# Patient Record
Sex: MALE | Race: WHITE
[De-identification: names, ages, dates, MRNs, and addresses within clinical notes are randomized per-mention and may not be internally consistent; named-entity substitution may affect disease eponyms.]

---

## 2017-12-23 ENCOUNTER — HOSPITAL ENCOUNTER (EMERGENCY)
Dept: HOSPITAL 45 - C.EDB | Age: 67
Discharge: HOME | End: 2017-12-23
Payer: COMMERCIAL

## 2017-12-23 VITALS
BODY MASS INDEX: 25.23 KG/M2 | WEIGHT: 186.29 LBS | HEIGHT: 72.01 IN | HEIGHT: 72.01 IN | WEIGHT: 186.29 LBS | BODY MASS INDEX: 25.23 KG/M2

## 2017-12-23 VITALS — HEART RATE: 72 BPM | DIASTOLIC BLOOD PRESSURE: 78 MMHG | OXYGEN SATURATION: 97 % | SYSTOLIC BLOOD PRESSURE: 138 MMHG

## 2017-12-23 VITALS — TEMPERATURE: 97.7 F

## 2017-12-23 DIAGNOSIS — G25.0: ICD-10-CM

## 2017-12-23 DIAGNOSIS — K61.0: Primary | ICD-10-CM

## 2017-12-23 LAB
ANION GAP SERPL CALC-SCNC: 17 MMOL/L (ref 16–25)
ANION GAP SERPL CALC-SCNC: 3 MMOL/L (ref 3–11)
BASOPHILS # BLD: 0.01 K/UL (ref 0–0.2)
BASOPHILS NFR BLD: 0.1 %
BUN SERPL-MCNC: 15 MG/DL (ref 7–18)
BUN/CREAT SERPL: 27 (ref 10–20)
CA-I BLD-SCNC: 1.18 MMOL/L (ref 1.12–1.32)
CALCIUM SERPL-MCNC: 9.1 MG/DL (ref 8.5–10.1)
CHLORIDE BLD-SCNC: 102 MEQ/L (ref 101–112)
CHLORIDE SERPL-SCNC: 104 MMOL/L (ref 98–107)
CO2 SERPL-SCNC: 29 MMOL/L (ref 21–32)
COMPLETE: YES
CREAT BLD-MCNC: 0.6 MG/DL (ref 0.6–1.3)
CREAT CL PREDICTED SERPL C-G-VRATE: 134.3 ML/MIN
CREAT SERPL-MCNC: 0.57 MG/DL (ref 0.6–1.4)
EOSINOPHIL NFR BLD AUTO: 121 K/UL (ref 130–400)
GLUCOSE SERPL-MCNC: 91 MG/DL (ref 70–99)
HCT VFR BLD AUTO: 43 % (ref 42–52)
HCT VFR BLD CALC: 45.1 % (ref 42–52)
HGB BLD-MCNC: 14.6 G/DL (ref 14–18)
IG%: 0.3 %
IMM GRANULOCYTES NFR BLD AUTO: 34.8 %
ISTAT CARBON DIOXIDE: 27 MEQ/L (ref 24–31)
LYMPHOCYTES # BLD: 3.82 K/UL (ref 1.2–3.4)
MCH RBC QN AUTO: 27.2 PG (ref 25–34)
MCHC RBC AUTO-ENTMCNC: 32.8 G/DL (ref 32–36)
MCV RBC AUTO: 82.8 FL (ref 80–100)
MONOCYTES NFR BLD: 7.9 %
NEUTROPHILS # BLD AUTO: 0.4 %
NEUTROPHILS NFR BLD AUTO: 56.5 %
PMV BLD AUTO: 9.3 FL (ref 7.4–10.4)
POTASSIUM SERPL-SCNC: 4.1 MMOL/L (ref 3.5–5.1)
RBC # BLD AUTO: 5.45 M/UL (ref 4.7–6.1)
SODIUM BLD-SCNC: 140 MEQ/L (ref 135–144)
SODIUM SERPL-SCNC: 136 MMOL/L (ref 136–145)
WBC # BLD AUTO: 10.98 K/UL (ref 4.8–10.8)

## 2017-12-23 NOTE — EMERGENCY ROOM VISIT NOTE
History


First contact with patient:  12:52


Chief Complaint:  WOUND INFECTION


Stated Complaint:  CYST/LUMP ON R BUTTOCK


Nursing Triage Summary:  


Thursday pt developed "a lump on right buttock"





red and hot and growing, 





no drainage





History of Present Illness


The patient is a 67 year old male who presents to the Emergency Room via 

private vehicle with complaints of "Cyst/lump on R buttock"/ the patient states 

that he has history of systems developing on his body however he developed pain 

in the right cranial region this past Thursday and notes swelling that has been 

progressing.  He states that there is been no drainage.  He does have a fissure 

on the other side.  He notes that he has had cysts for years but has not seen 

anybody specifically for these.  He rates the right gluteal pain as a 5/10.  He 

notes night sweats but is unsure if he's had fevers.





Review of Systems


A complete 10-point Review of Systems was discussed with the patient, with 

pertinent positives and negatives listed in the History of Present Illness. All 

remaining Review of Systems questions can be considered negative unless 

otherwise specified.





Past Medical/Surgical History


Hernia repair, renal calculi, essential tremor, cysts





Family History


Essential tremor





Social History


Smoking Status:  Never Smoker


Patient lives locally with wife.





Current/Historical Medications


Scheduled


Ciprofloxacin Hcl (Cipro), 500 MG PO BID


Metronidazole (Flagyl), 500 MG PO TID


Propranolol HCl (Propranolol HCl), 60 MG PO BID





Physical Exam


Vital Signs











  Date Time  Temp Pulse Resp B/P (MAP) Pulse Ox O2 Delivery O2 Flow Rate FiO2


 


12/23/17 17:05  72 18 138/78 97   


 


12/23/17 15:00  80 20 140/80 98 Room Air  


 


12/23/17 13:57  83 20 141/78 97 Room Air  


 


12/23/17 12:34 36.5 85 20 153/81 96 Room Air  











Physical Exam


VITAL SIGNS - Vital signs and nursing notes were reviewed.  Stable.  Afebrile.


GENERAL -67-year-old male appearing his stated age who is in no acute distress. 

Communicates well with provider and answers questions appropriately.


SKIN - Without rashes.  On the right medial and superior portion of the right 

gluteal/right posterior scrotal region there is a 3 cm circular palpable region 

that appears to be low density.  There is no erythema.  Minimal tenderness to 

palpation.


HEAD - NC/AT.





Medical Decision & Procedures


ER Provider


Diagnostic Interpretation:


RIGHT PERINEUM ULTRASOUND





CLINICAL HISTORY: R anterior gluteal/posterior scrotal palpable lump.   





COMPARISON STUDY:  None.





FINDINGS: Increase echogenicity within the subcutaneous fat consistent with


inflammatory change. There is a 4.5 x 3.3 x 2.1 cm subcutaneous fluid collection


within the right perineum.





IMPRESSION:  A 4.5 x 3.3 x 2.1 cm subcutaneous fluid collection within the right


perineum. This likely represents an abscess. 














Electronically signed by:  Eric Irvin M.D.


12/23/2017 2:37 PM





Dictated Date/Time:  12/23/2017 2:35 PM





PELVIS W/IV CONT ONLY (CT)





HISTORY: Right perirectal lump/scrotal collection.     





TECHNIQUE: Multiaxial CT images of the pelvis are performed following the use of


intravenous contrast.





COMPARISON STUDY:  Gluteal ultrasound 12/23/2017.





FINDINGS: Confirmation of the 4.0 x 2.5 cm right anterior perianal/perineal


peripheral enhancing fluid collection consistent with an abscess. There is


associated soft tissue thickening within the right side of the external


sphincter of the rectum and edema within the right intersphincteric space


suggestive of a small fistula. The prostate gland is enlarged measuring 6.4 cm


in diameter. Normal bladder. Small fat-containing right inguinal hernia. Colonic


diverticulosis. Small fat-containing umbilical hernia. Trace bilateral


hydroceles. No suspicious lytic or blastic osseous lesions.





IMPRESSION:  


A 4.0 x 2.5 cm right perianal abscess. There is also suggestion of a small


fistula extending into the right perirectal location which would be expected


with the perianal abscess. 











Electronically signed by:  Eric Irvin M.D.


12/23/2017 3:39 PM





Dictated Date/Time:  12/23/2017 3:30 PM





Laboratory Results


12/23/17 14:50








Red Blood Count 5.45, Mean Corpuscular Volume 82.8, Mean Corpuscular Hemoglobin 

27.2, Mean Corpuscular Hemoglobin Concent 32.8, Mean Platelet Volume 9.3, 

Neutrophils (%) (Auto) 56.5, Lymphocytes (%) (Auto) 34.8, Monocytes (%) (Auto) 

7.9, Eosinophils (%) (Auto) 0.4, Basophils (%) (Auto) 0.1, Neutrophils # (Auto) 

6.21, Lymphocytes # (Auto) 3.82, Monocytes # (Auto) 0.87, Eosinophils # (Auto) 

0.04, Basophils # (Auto) 0.01





12/23/17 14:50

















Test


  12/23/17


14:50


 


White Blood Count


  10.98 K/uL


(4.8-10.8)


 


Red Blood Count


  5.45 M/uL


(4.7-6.1)


 


Hemoglobin


  14.8 g/dL


(14.0-18.0)


 


Hematocrit 45.1 % (42-52) 


 


Mean Corpuscular Volume


  82.8 fL


()


 


Mean Corpuscular Hemoglobin


  27.2 pg


(25-34)


 


Mean Corpuscular Hemoglobin


Concent 32.8 g/dl


(32-36)


 


Platelet Count


  121 K/uL


(130-400)


 


Mean Platelet Volume


  9.3 fL


(7.4-10.4)


 


Neutrophils (%) (Auto) 56.5 % 


 


Lymphocytes (%) (Auto) 34.8 % 


 


Monocytes (%) (Auto) 7.9 % 


 


Eosinophils (%) (Auto) 0.4 % 


 


Basophils (%) (Auto) 0.1 % 


 


Neutrophils # (Auto)


  6.21 K/uL


(1.4-6.5)


 


Lymphocytes # (Auto)


  3.82 K/uL


(1.2-3.4)


 


Monocytes # (Auto)


  0.87 K/uL


(0.11-0.59)


 


Eosinophils # (Auto)


  0.04 K/uL


(0-0.5)


 


Basophils # (Auto)


  0.01 K/uL


(0-0.2)


 


RDW Standard Deviation


  43.8 fL


(36.4-46.3)


 


RDW Coefficient of Variation


  14.5 %


(11.5-14.5)


 


Immature Granulocyte % (Auto) 0.3 % 


 


Immature Granulocyte # (Auto)


  0.03 K/uL


(0.00-0.02)


 


Anion Gap


  3.0 mmol/L


(3-11)


 


Est Creatinine Clear Calc


Drug Dose 134.3 ml/min 


 


 


Estimated GFR (


American) 121.4 


 


 


Estimated GFR (Non-


American 104.8 


 


 


BUN/Creatinine Ratio 27.0 (10-20) 


 


Calcium Level


  9.1 mg/dl


(8.5-10.1)











Medical Decision


Patient was seen and evaluated as above.  He presents to us today with swelling/

cyst of the right anterior medial buttock.  On exam this region does appear to 

be tender but there is no induration, or erythema.  He does have other cysts 

throughout his body appreciated to visual inspection externally.  His vital 

signs were stable.  Decision was made to obtain an ultrasound of the region to 

identify its contents/composition.  It is no evidence of foreign gangrene on 

exam.  Ultrasound reveals what is likely to be an abscess however the concern 

on exam is that this is in between the perirectal and scrotal region likely in 

the perineum.   With the history of fissure in the past I will order a CT scan 

of the pelvis with IV contrast to evaluate for such. Abscess noted. Reviewed 

with attending, Dr. Peter.  Patient presented, region was dressed with 

Betadine, and sterilely draped.  The patient was anesthetized with 3 mL's 1% 

buffered lidocaine with epinephrine.  An 18-gauge was then directed away from 

the sphincter without success.  Dr. Peter and I then, bluntly dissected to 

successfully reach the pocket which was expressed, and thoroughly irrigated.  

Culture was sent to the lab for evaluation.  He'll be placed upon Cipro and 

Flagyl.  Patient's sphincter tone was intact post procedure.  He appears stable 

for outpatient management.  Region was dressed.  He is to return in 2 days for 

packing removal.  They were educated upon management, educated upon worrisome 

symptoms which to return, had questions prior to discharge, and was discharged 

home in good condition.





In evaluation treatment this patient following differential diagnoses were 

entertained: Perirectal abscess, 48 gangrene, cellulitis, cyst, among others.  

He is also to follow up regarding the lump on the left side of the neck.  He 

notes that he is to follow-up with Dr. Herron later this week/early next week.





Medication Reconcilliation


Current Medication List:  was personally reviewed by me





Blood Pressure Screening


Patient's blood pressure:  Elevated blood pressure


Blood pressure disposition:  Elevated BP felt to be situational, Referred to PCP





Impression





 Primary Impression:  


 Perianal abscess





Departure Information


Dispostion


Home / Self-Care





Condition


GOOD





Prescriptions





Metronidazole (Flagyl) 500 Mg Tab


500 MG PO TID for 7 Days, #21 TAB


   Prov: Vinod Quintero PA-C         12/23/17 


Ciprofloxacin Hcl (CIPRO) 500 Mg Tab


500 MG PO BID for 7 Days, #14 TAB


   Prov: Vinod Quintero PA-C         12/23/17





Referrals


No Doctor, Assigned (PCP)





Patient Instructions


My [facility]





Additional Instructions





You were seen in the emergency Department for a perirectal abscess.





At this time even prescribed Flagyl and Cipro.





Please do not drink alcohol with these medications.





Please stay well hydrated and eat healthy.





Please keep the area clean.





The packing is to be removed in 2 days.  Please return to have this performed.





Please follow-up regarding the mass on the left side of her neck with her 

family doctor.  Please call to establish this.





Please return with any new/concerning symptoms.

## 2017-12-23 NOTE — DIAGNOSTIC IMAGING REPORT
RIGHT PERINEUM ULTRASOUND



CLINICAL HISTORY: R anterior gluteal/posterior scrotal palpable lump.   



COMPARISON STUDY:  None.



FINDINGS: Increase echogenicity within the subcutaneous fat consistent with

inflammatory change. There is a 4.5 x 3.3 x 2.1 cm subcutaneous fluid collection

within the right perineum.



IMPRESSION:  A 4.5 x 3.3 x 2.1 cm subcutaneous fluid collection within the right

perineum. This likely represents an abscess. 









Electronically signed by:  Eric Irvin M.D.

12/23/2017 2:37 PM



Dictated Date/Time:  12/23/2017 2:35 PM

## 2017-12-23 NOTE — EMERGENCY ROOM VISIT NOTE
ED Visit Note


First contact with patient:  12:52


I have personally seen and evaluated the patient with the PA.  I agree with the 

diagnosis and management decisions and have been personally involved in the 

case.  I was present for the I&D procedure.  Patient tolerated the procedure 

well.  Patient will be discharged on Cipro and Flagyl 7 days.  Patient was 

cleansed and packed by Vinod Quintero PA-C, please see his notes for further 

details of the history, physical and visit.

## 2017-12-23 NOTE — DIAGNOSTIC IMAGING REPORT
PELVIS W/IV CONT ONLY (CT)



HISTORY: Right perirectal lump/scrotal collection.     



TECHNIQUE: Multiaxial CT images of the pelvis are performed following the use of

intravenous contrast.



COMPARISON STUDY:  Gluteal ultrasound 12/23/2017.



FINDINGS: Confirmation of the 4.0 x 2.5 cm right anterior perianal/perineal

peripheral enhancing fluid collection consistent with an abscess. There is

associated soft tissue thickening within the right side of the external

sphincter of the rectum and edema within the right intersphincteric space

suggestive of a small fistula. The prostate gland is enlarged measuring 6.4 cm

in diameter. Normal bladder. Small fat-containing right inguinal hernia. Colonic

diverticulosis. Small fat-containing umbilical hernia. Trace bilateral

hydroceles. No suspicious lytic or blastic osseous lesions.



IMPRESSION:  

A 4.0 x 2.5 cm right perianal abscess. There is also suggestion of a small

fistula extending into the right perirectal location which would be expected

with the perianal abscess. 







Electronically signed by:  Eric Irvin M.D.

12/23/2017 3:39 PM



Dictated Date/Time:  12/23/2017 3:30 PM

## 2017-12-26 ENCOUNTER — HOSPITAL ENCOUNTER (EMERGENCY)
Dept: HOSPITAL 45 - C.EDB | Age: 67
Discharge: HOME | End: 2017-12-26
Payer: COMMERCIAL

## 2017-12-26 VITALS
HEIGHT: 78 IN | WEIGHT: 178.57 LBS | WEIGHT: 178.57 LBS | BODY MASS INDEX: 20.66 KG/M2 | HEIGHT: 78 IN | BODY MASS INDEX: 20.66 KG/M2

## 2017-12-26 VITALS — DIASTOLIC BLOOD PRESSURE: 90 MMHG | HEART RATE: 60 BPM | SYSTOLIC BLOOD PRESSURE: 140 MMHG | TEMPERATURE: 97.52 F

## 2017-12-26 DIAGNOSIS — R97.20: ICD-10-CM

## 2017-12-26 DIAGNOSIS — D50.9: ICD-10-CM

## 2017-12-26 DIAGNOSIS — K61.0: Primary | ICD-10-CM

## 2017-12-26 NOTE — EMERGENCY ROOM VISIT NOTE
History


First contact with patient:  10:53


Chief Complaint:  OTHER COMPLAINT


Stated Complaint:  NEEDS PACKING REMOVED;HAD AN ASSIT





History of Present Illness


The patient is a 67 year old male who presents to the Emergency Room for 

packing removal from a perianal cyst that was drained in our department 2 days 

ago.  The patient has been taking Cipro and Flagyl antibiotics as prescribed.  

He does report improving pain and minimal drainage.  He has been having bowel 

movements without any significant discomfort, and denies any fever or chills.





Review of Systems


6 system review was performed and was negative except for pertinent positives 

and negatives as indicated in history of present illness





Past Medical/Surgical History





Medical Problems:


(1) Elevated Prostate Specific Antigen [Psa]


(2) Iron Defic Anemia Nos





Family History


Unremarkable





Social History


Smoking Status:  Never Smoker


Alcohol Use:  none


Occupation Status:  retired





Current/Historical Medications


Scheduled


Ciprofloxacin Hcl (Cipro), 500 MG PO BID


Metronidazole (Flagyl), 500 MG PO TID


Propranolol HCl (Propranolol HCl), 60 MG PO BID





Physical Exam


Vital Signs











  Date Time  Temp Pulse Resp B/P (MAP) Pulse Ox O2 Delivery O2 Flow Rate FiO2


 


12/26/17 11:33 36.4 60 19 140/90    


 


12/26/17 10:52 36.4 60 19 140/90  Room Air  











Physical Exam


GASTROINTESTINAL: Examination shows no packing in the perianal cyst.  There is 

no significant erythema.  Moderate edema is still noted without any purulent 

drainage.  The wound was thoroughly explored to show no evidence for retained 

packing.





Medical Decision & Procedures


ED Course


Patient history and physical exam were performed.  Nurse's notes were reviewed.

  I did review culture results from 2 days ago, showing Escherichia coli that 

is pansensitive.  The patient was instructed to continue and finish his Cipro 

and Flagyl antibiotics as previously prescribed.  Return to the emergency 

department for progressively worsening swelling, pain or drainage.  The patient 

was happy with plan of care, and voiced understanding of all discharge 

instructions.





Medical Decision








Blood Pressure Screening


Patient's blood pressure:  Normal blood pressure





Impression





 Primary Impression:  


 Perianal abscess





Departure Information


Dispostion


Home / Self-Care





Forms


HOME CARE DOCUMENTATION FORM,                                                 

               IMPORTANT VISIT INFORMATION





Patient Instructions


My [facility]





Additional Instructions





Continue and finish the current antibiotics that you are taking.


Keep the wound clean and covered with an antibiotic ointment until it heals.


Return to the emergency department for any re-developing swelling, pain, 

redness or fever.

## 2018-01-03 ENCOUNTER — HOSPITAL ENCOUNTER (OUTPATIENT)
Dept: HOSPITAL 45 - C.ULTR | Age: 68
Discharge: HOME | End: 2018-01-03
Attending: FAMILY MEDICINE
Payer: COMMERCIAL

## 2018-01-03 DIAGNOSIS — R22.1: Primary | ICD-10-CM

## 2018-01-03 NOTE — DIAGNOSTIC IMAGING REPORT
ULTRASOUND SOFT TISSUES NECK



CLINICAL HISTORY: Left-sided neck mass.



COMPARISON STUDY: No priors.



FINDINGS: Real-time, grayscale, and color flow sonography of the left neck is

performed at the site of palpable concern. There is a heterogeneously hypoechoic

nodule identified adjacent to the left parotid gland. This measures 2.5 x 1.2 x

2.0 cm and demonstrates internal flow on color imaging. There is a second

slightly more inferior adjacent irregular and heterogeneously hypoechoic nodule.

This is located just deep to the dermal surface and measures 1.9 x 1.0 x 1.6 cm.

This also shows flow on color imaging.



IMPRESSION: There are 2 heterogeneous and irregular hypoechoic nodules

identified in the left neck at the indicated site of interest. The appearance is

most suggestive of abnormal lymph nodes. Follow-up with ENT is recommended.

Fine-needle aspiration should be considered for further assessment.







Electronically signed by:  Rock Coffey M.D.

1/3/2018 11:51 AM



Dictated Date/Time:  1/3/2018 11:48 AM

## 2018-01-09 ENCOUNTER — HOSPITAL ENCOUNTER (OUTPATIENT)
Dept: HOSPITAL 45 - C.ULTR | Age: 68
Discharge: HOME | End: 2018-01-09
Attending: SURGERY
Payer: COMMERCIAL

## 2018-01-09 DIAGNOSIS — C79.89: ICD-10-CM

## 2018-01-09 DIAGNOSIS — R22.1: Primary | ICD-10-CM

## 2018-01-09 NOTE — DIAGNOSTIC IMAGING REPORT
ULTRASOUND GUIDED FINE NEEDLE ASPIRATION OF LEFT NECK SUPERFICIAL MASS AND

ENLARGED LEFT LEVEL 2 CERVICAL LYMPH NODE



CLINICAL HISTORY: Mass of lateral left neck.    



COMPARISON STUDY:  Neck ultrasound January 3, 2018.



PROCEDURE: Sonography of the left neck again demonstrated a 1.9 cm superficial

left upper neck mass centered within the dermis. An enlarged 2.5 cm left level 2

cervical lymph node was also noted. These

findings were targeted for fine needle aspiration. The procedure, risks and

benefits were discussed with the patient and informed written consent was

obtained. The procedure was performed by Dr. Rajput following a timeout. Skin

of the left neck was prepped and draped in sterile fashion and local anesthesia

was achieved with 1% lidocaine. Under direct ultrasound guidance, 2 25-gauge

fine needle aspirations of the superficial lesion were performed followed by 2

22-gauge fine needle aspirations of this lesion. 3 25-gauge fine needle

aspirations of the left level 2 cervical lymph node were then performed. The

patient tolerated the procedure well and no immediate complications were

evident.



IMPRESSION:  Ultrasound guided fine needle aspiration of a 1.9 cm left neck

superficial mass and an enlarged left level 2 cervical lymph node. 









Electronically signed by:  Dhruv Rajput M.D.

1/9/2018 1:08 PM



Dictated Date/Time:  1/9/2018 1:04 PM

## 2018-01-17 ENCOUNTER — HOSPITAL ENCOUNTER (OUTPATIENT)
Dept: HOSPITAL 45 - C.LAB | Age: 68
Discharge: HOME | End: 2018-01-17
Attending: SURGERY
Payer: COMMERCIAL

## 2018-01-17 DIAGNOSIS — C79.9: Primary | ICD-10-CM

## 2018-01-17 DIAGNOSIS — C43.9: ICD-10-CM

## 2018-01-17 LAB
ALBUMIN SERPL-MCNC: 4 GM/DL (ref 3.4–5)
ALP SERPL-CCNC: 66 U/L (ref 45–117)
ALT SERPL-CCNC: 35 U/L (ref 12–78)
AST SERPL-CCNC: 22 U/L (ref 15–37)
BUN SERPL-MCNC: 18 MG/DL (ref 7–18)
CALCIUM SERPL-MCNC: 9.7 MG/DL (ref 8.5–10.1)
CO2 SERPL-SCNC: 29 MMOL/L (ref 21–32)
CREAT SERPL-MCNC: 0.55 MG/DL (ref 0.6–1.4)
GLUCOSE SERPL-MCNC: 119 MG/DL (ref 70–99)
POTASSIUM SERPL-SCNC: 4 MMOL/L (ref 3.5–5.1)
PROT SERPL-MCNC: 7.5 GM/DL (ref 6.4–8.2)
SODIUM SERPL-SCNC: 139 MMOL/L (ref 136–145)

## 2018-01-19 ENCOUNTER — HOSPITAL ENCOUNTER (OUTPATIENT)
Dept: HOSPITAL 45 - X.SURG | Age: 68
Discharge: HOME | End: 2018-01-19
Attending: SURGERY
Payer: COMMERCIAL

## 2018-01-19 VITALS — DIASTOLIC BLOOD PRESSURE: 89 MMHG | HEART RATE: 60 BPM | SYSTOLIC BLOOD PRESSURE: 145 MMHG | OXYGEN SATURATION: 98 %

## 2018-01-19 VITALS — TEMPERATURE: 97.7 F

## 2018-01-19 VITALS
BODY MASS INDEX: 25.39 KG/M2 | WEIGHT: 185.39 LBS | BODY MASS INDEX: 25.39 KG/M2 | HEIGHT: 71.5 IN | HEIGHT: 71.5 IN | WEIGHT: 185.39 LBS

## 2018-01-19 DIAGNOSIS — Z98.890: ICD-10-CM

## 2018-01-19 DIAGNOSIS — C77.0: ICD-10-CM

## 2018-01-19 DIAGNOSIS — I10: ICD-10-CM

## 2018-01-19 DIAGNOSIS — G20: ICD-10-CM

## 2018-01-19 DIAGNOSIS — C44.91: ICD-10-CM

## 2018-01-19 DIAGNOSIS — L98.9: Primary | ICD-10-CM

## 2018-01-19 NOTE — [facility] POST OPERATIVE BRIEF NOTE
Immediate Operative Summary


Operative Date


Jan 19, 2018.





Pre-Operative Diagnosis





Left Mass Lateral Neck, Skin Lesion, Subcutaneous Cyst





Post-Operative Diagnosis





Same





Procedure(s) Performed





Left Neck Mass And Left Cervical Lymph Node Excisional Biopsy





Surgeon


Dr. Skelton





Assistant Surgeon(s)


None





Estimated Blood Loss


3 ML





Findings


Consistent with Post-Op Diagnosis





Specimens





A. Left Neck Mass, Long Suture Lateral, Short Suture Superior, sent PERMANENT 


B. Left Cervical Lymph Node, Sent FRESH, History of FNA of Melanoma





Drains


None





Anesthesia Type


General





Complication(s)


none





Disposition


Accompanied Pt To Recover:  no


Disposition:  Recovery Room / PACU

## 2018-01-19 NOTE — DISCHARGE INSTRUCTIONS-SURGCTR
Discharge Instructions


Date of Service


Jan 19, 2018.





Visit


Reason for Visit:  Mass Lateral Neck, Skin Lesion, Subcutaneous Cyst





Discharge


Discharge Diagnosis / Problem:  left neck skin mass, left cervical 

lymphadenopathy





Discharge Goals


Goal(s):  Decrease discomfort





Activity Recommendations


Activity Limitations:  resume your previous activity


Lifting Limitations:  gradually increase as tolerated


Shower/Bathe:  tomorrow


Driving or Machine Use:  resume 1 day after discharge





Anesthesia


.





Post Anesthesia Instructions:





If you have had General Anesthesia or IV Sedation:





*  Do not drive today.


*  Resume driving when surgeon permits.


*  Do not make important decisions or sign legal documents today.


*  Call surgeon for:





   1.  Temperature elevations greater than 101 degrees F.


   2.  Uncontrollable pain.


   3.  Excessive bleeding.


   4.  Persistent nausea and vomiting.


   5.  Medication intolerance (nausea, vomiting or rash).





*  For nausea and vomiting use only clear liquids such as: tea, soda, bouillon 

until nausea subsides, then gradually increase diet as tolerated.





*  If you have any concerns or questions, call your surgeon's office.  If 

physician is unavailable and it is an emergency, call 911 or go to the nearest 

emergency room.





.





Instructions / Follow-Up


Instructions / Follow-Up


Follow-up in 7-10 days for suture removal and pathology results.





Diet Recommendations


Home Diet:  resume previous diet





Procedures


Procedures Performed:  


Left Neck Mass And Left Cervical Lymph Node Excisional Biopsy





Pending Studies


Studies pending at discharge:  yes


List of pending studies:  


Pathology results





Medical Emergencies








.


Who to Call and When:





Medical Emergencies:  If at any time you feel your situation is an emergency, 

please call 911 immediately.





.





Non-Emergent Contact


Non-Emergency issues call your:  Surgeon


Contact Number:  [phone]





.


.








"Provider Documentation" section prepared by Nakul Skelton.








.





[place] Drug Monitoring Program


Search Results:  no issues identified

## 2018-01-19 NOTE — [facility] OPERATIVE REPORT
Operative Report


Operative Date


Jan 19, 2018.





Pre-Operative Diagnosis





Left Mass Lateral Neck, Skin Lesion, Subcutaneous Cyst





Post-Operative Diagnosis





Same





Procedure(s) Performed





Left Neck Mass And Left Cervical Lymph Node Excisional Biopsy





Surgeon


Dr. Skelton





Assistant Surgeon(s)


None





Estimated Blood Loss


3 ML





Findings


7 cm x 2.5 cm elliptical incision performed in oblique fashion and left neck to 

excise skin lesion.  Platysma was divided overlying the lymph node of lymph 

node was excised.  Good hemostasis.





Specimens





A. Left Neck Mass, Long Suture Lateral, Short Suture Superior, sent PERMANENT 


B. Left Cervical Lymph Node, Sent FRESH, History of FNA of Melanoma





Drains


none





Anesthesia


LMA





Complication(s)


None





Disposition


Recovery Room / PACU





Indications


67-year-old male with left neck skin lesion, FNA showed epithelial cells and 

possible basal cell carcinoma.  He also has a left submandibular lymph node 

that had an FNA performed and revealed metastatic melanoma, and not enough 

tissue was obtained for further pathologic testing.  Plan for wide local 

excision of left neck mass and excisional biopsy of left cervical lymph node.  

The risks of the procedure were discussed, all questions were answered, and the 

patient agreed to proceed with surgery as planned.





Description of Procedure


The patient was properly identified, consented, and taken to the operating room 

where he was placed in the supine position.  General anesthesia was induced.  

SCDs and a safety belt were placed.  Preoperative antibiotics were 

administered.  The patient's head was rotated to the right and his left neck 

and lower face was prepped and draped in the standard sterile fashion.  

Surgical timeout was performed and all parties were in agreement that this was 

the correct patient and procedure to be performed and we continued as planned.  





Local anesthetic was injected along the skin incision.  A 7 x 2.5 cm elliptical 

incision was made overlying the mass in a natural skin crease in order to 

obtain clear margins on the cutaneous lesion.  This was deepened down through 

the full thickness of the dermis and lifted off the subcutaneous tissue.  The 

specimen was oriented with 3-0 silk suture.  The submandibular lymph node was 

palpable in the medial portion of the incision.  The platysma was opened for a 

short distance overlying the lymph node.  The lymph node had a very dark 

appearance consistent with melanoma.  The lymph node was circumferentially 

dissected, the lymphovascular bundle was ligated with 3-0 silk tie.  The lymph 

node was completely excised and sent fresh to pathology.  The wound was 

irrigated and hemostasis was confirmed.  The platysma was closed with 

interrupted 3-0 Vicryl sutures.  There was some tension at the midpoint of the 

incision and flaps were raised overlying the platysma superiorly and inferiorly 

for 2 cm.  The skin was closed with interrupted 3-0 Vicryl deep dermal sutures.

  Due to the tension, the skin was closed with 4-0 Prolene simple interrupted 

sutures on the medial and lateral edges, and 3-0 Prolene interrupted simple 

sutures in the midportion of the incision.  A sterile dressing was placed over 

the wound.  





The patient was extubated in the operating room and taken to the PACU where he 

recovered without apparent incident.  All sponge, instrument and needle counts 

were correct at the conclusion of the procedure.  The patient tolerated the 

procedure well.


I attest to the content of the Intraoperative Record and any orders documented 

therein.  Any exceptions are noted below.

## 2018-01-19 NOTE — HISTORY & PHYSICAL BRIDGE - SC
H&P Re-Evaluation


Bridge Note:


I have examined the patient, reviewed the History & Physical and in the 

interval since the performance of the History & Physical I have noted the 

following changes of clinical significance: plan for excision left neck mass, 

left cervical lymph node biopsy, consent signed, risks discussed. No changes 

noted

## 2018-01-19 NOTE — ANESTHESIA PROGRESS NT - [facility]
Anesthesia Post Op Note


Date & Time


Jan 19, 2018 at 09:49





Vital Signs


Pain Intensity:  0





Vital Signs Past 12 Hours








  Date Time  Temp Pulse Resp B/P (MAP) Pulse Ox O2 Delivery O2 Flow Rate FiO2


 


1/19/18 09:11  60 16 145/89 (107) 98 Room Air  


 


1/19/18 08:45 36.5 63 16 138/81 (100) 96 Room Air  


 


1/19/18 08:42  61   92   


 


1/19/18 08:42  61      


 


1/19/18 08:37  63 10     


 


1/19/18 08:37  64 10  97   


 


1/19/18 08:36  71 16  96   


 


1/19/18 08:36  69 16     


 


1/19/18 08:35 36.5 64 12 125/77 94 Room Air  


 


1/19/18 08:35    125/77    


 


1/19/18 08:34    119/79    


 


1/19/18 08:31  67 12  97   


 


1/19/18 08:31  67 12     


 


1/19/18 08:30    124/78    


 


1/19/18 08:26  66 10 122/81 97   


 


1/19/18 08:26  68 10     


 


1/19/18 08:21  68 16     


 


1/19/18 08:21  67 16 132/76 98   


 


1/19/18 08:16  66 11 133/75 97   


 


1/19/18 08:16  66 11     


 


1/19/18 08:11 36.4 55 16 123/76 97 Mask 6 


 


1/19/18 08:11  61 11     


 


1/19/18 08:11  61 11 123/76 97   


 


1/19/18 06:31 36.4 52 18 121/71 (88) 96 Room Air  











Notes


Mental Status:  alert / awake / arousable, participated in evaluation


Pt Amnestic to Procedure:  Yes


Nausea / Vomiting:  adequately controlled


Pain:  adequately controlled


Airway Patency, RR, SpO2:  stable & adequate


BP & HR:  stable & adequate


Hydration State:  stable & adequate


Anesthetic Complications:  no major complications apparent

## 2018-01-22 ENCOUNTER — HOSPITAL ENCOUNTER (OUTPATIENT)
Dept: HOSPITAL 45 - C.LABBFT | Age: 68
Discharge: HOME | End: 2018-01-22
Attending: FAMILY MEDICINE
Payer: COMMERCIAL

## 2018-01-22 DIAGNOSIS — R97.20: ICD-10-CM

## 2018-01-22 DIAGNOSIS — Z00.00: ICD-10-CM

## 2018-01-22 DIAGNOSIS — R30.0: Primary | ICD-10-CM

## 2018-01-24 ENCOUNTER — HOSPITAL ENCOUNTER (OUTPATIENT)
Dept: HOSPITAL 45 - C.PET | Age: 68
Discharge: HOME | End: 2018-01-24
Attending: SURGERY
Payer: COMMERCIAL

## 2018-01-24 DIAGNOSIS — C43.9: Primary | ICD-10-CM

## 2018-01-24 DIAGNOSIS — C79.9: ICD-10-CM

## 2018-01-24 NOTE — DIAGNOSTIC IMAGING REPORT
PET/CT FULL BODY



HISTORY: Melanoma  C43.9 MALIGNANT MELANOMA SKIN



TECHNIQUE: PET/CT was performed from the base of the skull through the pelvis

following the intravenous administration of 13.37 mCi of [id number]. Non-contrast

CT imaging was performed over the same range without breath-hold for attenuation

correction of PET images and anatomic correlation, but not for primary

interpretation as it is not of standard diagnostic quality.



CT DOSE:    

 

COMPARISON: None.



FINDINGS:

 

HEAD AND NECK:  There is no FDG-avid disease or significant lymphadenopathy in

the imaged portions of the head and the neck.

 

CHEST:  There is no FDG-avid disease in the chest.  There is no axillary,

mediastinal, or hilar lymphadenopathy.  There is no pleural or pericardial

effusion.  There is no air-space disease or suspicious lung nodule.

 

ABDOMEN/PELVIS:  Below the diaphragm, tracer is distributed physiologically in

the gastrointestinal and genitourinary tracts. There is no significant

lymphadenopathy and no FDG-avid disease.

 

MUSCULOSKELETAL:  There is no FDG-avid or destructive bone lesion. Increased

activity in the palmar aspects of the hands presumably secondary to muscular

activity.



EXTREMITIES: No abnormal activity characteristics

 

IMPRESSION:

There is no definite evidence of recurrent FDG-avid disease.









The above report was generated using voice recognition software.  It may contain

grammatical, syntax or spelling errors.







Electronically signed by:  Nagi Buitrago M.D.

1/24/2018 1:04 PM



Dictated Date/Time:  1/24/2018 12:59 PM

## 2018-02-01 ENCOUNTER — HOSPITAL ENCOUNTER (OUTPATIENT)
Dept: HOSPITAL 45 - C.PATHSPEC | Age: 68
Discharge: HOME | End: 2018-02-01
Attending: DERMATOLOGY
Payer: COMMERCIAL

## 2018-02-01 DIAGNOSIS — C44.619: ICD-10-CM

## 2018-02-01 DIAGNOSIS — C44.311: ICD-10-CM

## 2018-02-01 DIAGNOSIS — L81.4: Primary | ICD-10-CM

## 2018-02-01 DIAGNOSIS — C44.519: ICD-10-CM

## 2018-04-20 ENCOUNTER — HOSPITAL ENCOUNTER (OUTPATIENT)
Dept: HOSPITAL 45 - C.LABSPEC | Age: 68
Discharge: HOME | End: 2018-04-20
Payer: COMMERCIAL

## 2018-04-20 DIAGNOSIS — C77.0: Primary | ICD-10-CM
